# Patient Record
Sex: FEMALE | HISPANIC OR LATINO | Employment: UNEMPLOYED | URBAN - NONMETROPOLITAN AREA
[De-identification: names, ages, dates, MRNs, and addresses within clinical notes are randomized per-mention and may not be internally consistent; named-entity substitution may affect disease eponyms.]

---

## 2022-05-05 ENCOUNTER — OFFICE VISIT (OUTPATIENT)
Dept: CARDIOLOGY | Facility: CLINIC | Age: 72
End: 2022-05-05

## 2022-05-05 VITALS
DIASTOLIC BLOOD PRESSURE: 88 MMHG | SYSTOLIC BLOOD PRESSURE: 150 MMHG | BODY MASS INDEX: 22.43 KG/M2 | HEART RATE: 60 BPM | WEIGHT: 148 LBS | HEIGHT: 68 IN

## 2022-05-05 DIAGNOSIS — R00.2 PALPITATIONS: Primary | ICD-10-CM

## 2022-05-05 DIAGNOSIS — R01.1 MURMUR, CARDIAC: ICD-10-CM

## 2022-05-05 DIAGNOSIS — Z86.73 H/O: STROKE: ICD-10-CM

## 2022-05-05 DIAGNOSIS — I10 PRIMARY HYPERTENSION: ICD-10-CM

## 2022-05-05 DIAGNOSIS — E55.9 VITAMIN D DEFICIENCY: ICD-10-CM

## 2022-05-05 PROCEDURE — 99204 OFFICE O/P NEW MOD 45 MIN: CPT | Performed by: INTERNAL MEDICINE

## 2022-05-05 PROCEDURE — 93000 ELECTROCARDIOGRAM COMPLETE: CPT | Performed by: INTERNAL MEDICINE

## 2022-05-05 RX ORDER — ERGOCALCIFEROL 1.25 MG/1
50000 CAPSULE ORAL WEEKLY
COMMUNITY

## 2022-05-05 RX ORDER — ASPIRIN 81 MG/1
81 TABLET ORAL DAILY
Qty: 30 TABLET | Refills: 6 | Status: SHIPPED | OUTPATIENT
Start: 2022-05-05

## 2022-05-05 RX ORDER — LISINOPRIL AND HYDROCHLOROTHIAZIDE 25; 20 MG/1; MG/1
1 TABLET ORAL DAILY
COMMUNITY

## 2022-05-05 RX ORDER — AMLODIPINE BESYLATE 10 MG/1
10 TABLET ORAL DAILY
COMMUNITY

## 2022-05-05 NOTE — PROGRESS NOTES
Chief Complaint   Patient presents with   • Establish Care     PCP referred, history of stroke, HTN. Currently using an  on the phone, pt speaks [place].  # [id number] at [phone]   • Stroke     Pt reports having stroke 8 years ago while in [facility]. Last stroke was 3 1/2 years ago while living in  [place]. Pt said she had never been told any cause of the strokes.          • Rapid Heart Rate     Pt  reports having random episodes of tachycardia, worse when she is really nervous. Last episode was last week. Will last a few moments.    • Cardiac history     Reports wearing a holter and had a stress years ago while in [facility].         CARDIAC COMPLAINTS  palpitations and H/O Stroke      Subjective   Heydi Keith is a 71 y.o. female came in today for her initial cardiac evaluation.  Most of her history was obtained through an .(Phone number is [phone].  The  number is #[phone]) she has history of hypertension who was diagnosed with stroke about 8 years ago while she was in [facility].  She was seen with dizziness and weakness.  She did not have any focal deficit.  She was told that she had a small bleed.  She claimed that she had an angiogram done at that time and was told everything was normal.  According to her, she did not receive any stent.  She had another episode about 3 and half years ago while she was in [place].  She is not able to recollect the hospital or that town when this happened.  She was told that she had another small bleed.  She was never learned what was the cause of the strokes.  She is now referred because she has been having random episodes of palpitation in the form of heart racing.  It occurs mostly when she is nervous.  She had an episode about a week ago.  It lasted for few minutes and then subside.  She apparently wore a Holter monitor many years ago as well as a stress test when she was in [facility].  She was told everything was  normal at that time.  She also claims that she had some labs done few months ago and was told everything was normal..  She denies having any chest pain.  She denies having any shortness of breath.  She denies having any syncopal episodes.  She apparently is moving to [place] by next month.  She denies smoking or drinking alcohol.  Her mother  with heart disease.  Her father  cause unknown.  Hypertension does run in the family    History reviewed. No pertinent surgical history.    Current Outpatient Medications   Medication Sig Dispense Refill   • amLODIPine (NORVASC) 10 MG tablet Take 10 mg by mouth Daily.     • lisinopril-hydrochlorothiazide (PRINZIDE,ZESTORETIC) 20-25 MG per tablet Take 1 tablet by mouth Daily.     • metoprolol tartrate (LOPRESSOR) 25 MG tablet Take 25 mg by mouth 2 (Two) Times a Day.     • vitamin D (ERGOCALCIFEROL) 1.25 MG (20954 UT) capsule capsule Take 50,000 Units by mouth 1 (One) Time Per Week.     • aspirin (aspirin) 81 MG EC tablet Take 1 tablet by mouth Daily. 30 tablet 6     No current facility-administered medications for this visit.           ALLERGIES:  Patient has no known allergies.    Past Medical History:   Diagnosis Date   • Hypertension    • Osteoporosis    • Stroke (HCC)    • Vitamin D deficiency        Social History     Tobacco Use   Smoking Status Never Smoker   Smokeless Tobacco Never Used          Family History   Problem Relation Age of Onset   • Heart disease Mother    • Hypertension Sister    • Hypertension Sister    • Hypertension Brother    • Hypertension Brother    • Hypertension Son        Review of Systems   Constitutional: Negative for decreased appetite and malaise/fatigue.   HENT: Negative for congestion and sore throat.    Eyes: Negative for blurred vision.   Cardiovascular: Positive for palpitations. Negative for chest pain.   Respiratory: Negative for shortness of breath and snoring.    Endocrine: Negative for cold intolerance and heat intolerance.  "  Hematologic/Lymphatic: Negative for adenopathy. Does not bruise/bleed easily.   Skin: Negative for itching, nail changes and skin cancer.   Musculoskeletal: Negative for arthritis and myalgias.   Gastrointestinal: Negative for abdominal pain, dysphagia and heartburn.   Genitourinary: Negative for bladder incontinence and frequency.   Neurological: Negative for dizziness, light-headedness, seizures and vertigo.   Psychiatric/Behavioral: Negative for altered mental status.   Allergic/Immunologic: Negative for environmental allergies and hives.       Diabetes- No  Thyroid- normal    Objective     /88 (BP Location: Right arm)   Pulse 60   Ht 172.7 cm (68\")   Wt 67.1 kg (148 lb)   BMI 22.50 kg/m²     Vitals and nursing note reviewed.   Constitutional:       Appearance: Healthy appearance. Not in distress.   Eyes:      Conjunctiva/sclera: Conjunctivae normal.      Pupils: Pupils are equal, round, and reactive to light.   HENT:      Head: Normocephalic.   Pulmonary:      Effort: Pulmonary effort is normal.      Breath sounds: Normal breath sounds.   Cardiovascular:      PMI at left midclavicular line. Normal rate. Regular rhythm.      Murmurs: There is a systolic murmur.   Abdominal:      General: Bowel sounds are normal.      Palpations: Abdomen is soft.   Musculoskeletal: Normal range of motion.      Cervical back: Normal range of motion and neck supple. Skin:     General: Skin is warm and dry.   Neurological:      Mental Status: Alert, oriented to person, place, and time and oriented to person, place and time.           ECG 12 Lead    Date/Time: 5/5/2022 12:42 PM  Performed by: Mayra Betancourt MD  Authorized by: Mayra Betancourt MD   Previous ECG: no previous ECG available  Rhythm: sinus rhythm  Rate: normal  Conduction comments: Short [place] Interval  QRS axis: normal    Clinical impression: non-specific ECG              Assessment/Plan   BMI is within normal parameters. No follow-up required.   "   Diagnoses and all orders for this visit:    1. Palpitations (Primary)  -     Cancel: Mobile Cardiac Outpatient Telemetry; Future  -     Adult Transthoracic Echo Complete W/ Cont if Necessary Per Protocol; Future  -     US Carotid Bilateral; Future    2. H/O: stroke  -     Cancel: Adult Transthoracic Echo Complete W/ Cont if Necessary Per Protocol; Future  -     Cancel: Mobile Cardiac Outpatient Telemetry; Future  -     Cancel: US Carotid Bilateral; Future  -     aspirin (aspirin) 81 MG EC tablet; Take 1 tablet by mouth Daily.  Dispense: 30 tablet; Refill: 6  -     Adult Transthoracic Echo Complete W/ Cont if Necessary Per Protocol; Future  -     Mobile Cardiac Outpatient Telemetry; Future    3. Primary hypertension    4. Vitamin D deficiency    5. Murmur, cardiac  -     Cancel: Adult Transthoracic Echo Complete W/ Cont if Necessary Per Protocol; Future       At baseline her heart rate is stable.  Her blood pressure is slightly elevated.  Her EKG showed sinus rhythm with a short NH interval.  Her QTC is normal.  Her clinical examination reveals a BMI of 23.  She has no carotid bruit.  She does have 3/6 systolic murmur at the mitral area.  She has normal peripheral pulse and no pedal edema.    Regarding the palpitation, given the fact that she does have a short NH interval possibility of PSVT cannot be ruled out and the other possibility of an A. fib also cannot be ruled out at this time.  She ideally needs to wear a monitor for about a month to see whether any arrhythmia can be picked up.  She wants to have it done at [place].  I did give her the request to undergo the testing.    Regarding her history of stroke, she needs an MRI to make sure whether the this is infarct or whether she had an old hemorrhagic stroke.  She at this time is not interested in undergoing an MRI.  I also advised her that she needs to have an echocardiogram with a bubble study done to evaluate the LV function, valvular structures and to  look for any shunt.  She also need an carotid ultrasound to make sure there is no significant stenosis.  Initially I scheduled her to undergo the test but she again does not want anything to be done here but have it done at [place] when she moves there    Regarding her hypertension, it is still slightly elevated.  She is already on a combination of calcium channel blockers, beta-blockers as well as ACE inhibitor's with a diuretic.  I cannot go up on the beta-blockers because of the heart rate.  If her potassium is normal then we can try adding Aldactone.  She is advised to talk to you about it    She apparently has history of vitamin D deficiency and is been taking 50,000 units a week.  Continue the same    Regarding her cardiac murmur, it appears to be secondary to mitral regurgitation but I like to get a echocardiogram to evaluate the valvular structures.    Regarding advanced directive, she does not have a living will.  I did give her some booklets in [place] to help her decide about living will and power of     Since she is moving to [place], I did not make a follow-up visit.  If she changes her mind, she is advised to call our office.             Electronically signed by Mayra Betancourt MD May 5, 2022 12:29 EDT